# Patient Record
Sex: FEMALE | Race: WHITE | Employment: FULL TIME | URBAN - METROPOLITAN AREA
[De-identification: names, ages, dates, MRNs, and addresses within clinical notes are randomized per-mention and may not be internally consistent; named-entity substitution may affect disease eponyms.]

---

## 2019-08-29 ENCOUNTER — HOSPITAL ENCOUNTER (OUTPATIENT)
Dept: LAB | Age: 49
Discharge: HOME | End: 2019-08-29
Payer: COMMERCIAL

## 2019-08-29 PROCEDURE — 88175 CYTOPATH C/V AUTO FLUID REDO: CPT

## 2019-08-29 PROCEDURE — 87624 HPV HI-RISK TYP POOLED RSLT: CPT

## 2020-12-25 ENCOUNTER — APPOINTMENT (OUTPATIENT)
Dept: GENERAL RADIOLOGY | Age: 50
End: 2020-12-25
Attending: PHYSICIAN ASSISTANT
Payer: COMMERCIAL

## 2020-12-25 ENCOUNTER — HOSPITAL ENCOUNTER (EMERGENCY)
Age: 50
Discharge: HOME | End: 2020-12-25
Attending: EMERGENCY MEDICINE | Admitting: EMERGENCY MEDICINE
Payer: COMMERCIAL

## 2020-12-25 VITALS
SYSTOLIC BLOOD PRESSURE: 147 MMHG | OXYGEN SATURATION: 98 % | RESPIRATION RATE: 18 BRPM | DIASTOLIC BLOOD PRESSURE: 98 MMHG | HEART RATE: 103 BPM | WEIGHT: 190 LBS | TEMPERATURE: 98 F
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
## 2020-12-25 PROCEDURE — 75810000293 HC SIMP/SUPERF WND  RPR

## 2020-12-25 PROCEDURE — 90471 IMMUNIZATION ADMIN: CPT

## 2020-12-25 PROCEDURE — 90686 IIV4 VACC NO PRSV 0.5 ML IM: CPT | Performed by: PHYSICIAN ASSISTANT

## 2020-12-25 PROCEDURE — 90472 IMMUNIZATION ADMIN EACH ADD: CPT

## 2020-12-25 PROCEDURE — 74011250637 HC RX REV CODE- 250/637: Performed by: PHYSICIAN ASSISTANT

## 2020-12-25 PROCEDURE — 73120 X-RAY EXAM OF HAND: CPT

## 2020-12-25 PROCEDURE — 90715 TDAP VACCINE 7 YRS/> IM: CPT | Performed by: PHYSICIAN ASSISTANT

## 2020-12-25 PROCEDURE — 74011250636 HC RX REV CODE- 250/636: Performed by: PHYSICIAN ASSISTANT

## 2020-12-25 PROCEDURE — 99283 EMERGENCY DEPT VISIT LOW MDM: CPT

## 2020-12-25 RX ORDER — AMOXICILLIN AND CLAVULANATE POTASSIUM 875; 125 MG/1; MG/1
1 TABLET, FILM COATED ORAL 2 TIMES DAILY
Qty: 14 TAB | Refills: 0 | Status: SHIPPED | OUTPATIENT
Start: 2020-12-25 | End: 2021-01-01

## 2020-12-25 RX ORDER — HYDROCODONE BITARTRATE AND ACETAMINOPHEN 5; 325 MG/1; MG/1
1 TABLET ORAL
Qty: 10 TAB | Refills: 0 | Status: SHIPPED | OUTPATIENT
Start: 2020-12-25 | End: 2020-12-28

## 2020-12-25 RX ORDER — LANOLIN ALCOHOL/MO/W.PET/CERES
1000 CREAM (GRAM) TOPICAL DAILY
COMMUNITY

## 2020-12-25 RX ORDER — AMOXICILLIN AND CLAVULANATE POTASSIUM 875; 125 MG/1; MG/1
1 TABLET, FILM COATED ORAL
Status: COMPLETED | OUTPATIENT
Start: 2020-12-25 | End: 2020-12-25

## 2020-12-25 RX ORDER — HYDROCODONE BITARTRATE AND ACETAMINOPHEN 5; 325 MG/1; MG/1
1 TABLET ORAL
Status: COMPLETED | OUTPATIENT
Start: 2020-12-25 | End: 2020-12-25

## 2020-12-25 RX ADMIN — TETANUS TOXOID, REDUCED DIPHTHERIA TOXOID AND ACELLULAR PERTUSSIS VACCINE, ADSORBED 0.5 ML: 5; 2.5; 8; 8; 2.5 SUSPENSION INTRAMUSCULAR at 17:52

## 2020-12-25 RX ADMIN — AMOXICILLIN AND CLAVULANATE POTASSIUM 1 TABLET: 875; 125 TABLET, FILM COATED ORAL at 19:38

## 2020-12-25 RX ADMIN — HYDROCODONE BITARTRATE AND ACETAMINOPHEN 1 TABLET: 5; 325 TABLET ORAL at 17:51

## 2020-12-25 RX ADMIN — INFLUENZA VIRUS VACCINE 0.5 ML: 15; 15; 15; 15 SUSPENSION INTRAMUSCULAR at 19:38

## 2020-12-25 NOTE — ED TRIAGE NOTES
Alert female arrives to ED after dog bite pta, reports it was her dog and dogs shots utd. Pt with wounds to rt hand. Bleeding controlled with dressing. Pt asking for tetanus update. Pain 9/10 throbbing.

## 2020-12-26 NOTE — ED PROVIDER NOTES
EMERGENCY DEPARTMENT HISTORY AND PHYSICAL EXAM    Date: 12/25/2020  Patient Name: José Luis Flores    History of Presenting Illness     Chief Complaint   Patient presents with    Dog Bite         History Provided By:     Chief Complaint:  Duration:   Timing:    Location:   Quality:   Severity:   Modifying Factors:   Associated Symptoms: none       Additional History (Context): José Luis Flores is a 52 y.o. female with a history of       PCP: Salvatore Garcia MD    Current Outpatient Medications   Medication Sig Dispense Refill    cyanocobalamin 1,000 mcg tablet Take 1,000 mcg by mouth daily.  HYDROcodone-acetaminophen (Norco) 5-325 mg per tablet Take 1 Tab by mouth every six (6) hours as needed for Pain for up to 3 days. Max Daily Amount: 4 Tabs. 10 Tab 0    amoxicillin-clavulanate (Augmentin) 875-125 mg per tablet Take 1 Tab by mouth two (2) times a day for 7 days. 14 Tab 0       Past History     Past Medical History:  History reviewed. No pertinent past medical history. Past Surgical History:  History reviewed. No pertinent surgical history. Family History:  History reviewed. No pertinent family history. Social History:  Social History     Tobacco Use    Smoking status: Not on file   Substance Use Topics    Alcohol use: Not on file    Drug use: Not on file       Allergies:  No Known Allergies      Review of Systems   Review of Systems   Constitutional: Negative for chills and fever. HENT: Negative for congestion, rhinorrhea and sore throat. Respiratory: Negative for cough and shortness of breath. Cardiovascular: Negative for chest pain. Gastrointestinal: Negative for abdominal pain, blood in stool, constipation, diarrhea, nausea and vomiting. Genitourinary: Negative for dysuria, frequency and hematuria. Musculoskeletal: Negative for back pain and myalgias. Skin: Positive for wound. Negative for rash. Neurological: Negative for dizziness and headaches.    All other systems reviewed and are negative. All Other Systems Negative  Physical Exam     Vitals:    12/25/20 1708   BP: (!) 147/98   Pulse: (!) 103   Resp: 18   Temp: 98 °F (36.7 °C)   SpO2: 98%   Weight: 86.2 kg (190 lb)     Physical Exam  Vitals signs and nursing note reviewed. Constitutional:       General: She is not in acute distress. Appearance: She is well-developed. She is not diaphoretic. HENT:      Head: Normocephalic and atraumatic. Eyes:      Conjunctiva/sclera: Conjunctivae normal.   Neck:      Musculoskeletal: Normal range of motion and neck supple. Cardiovascular:      Rate and Rhythm: Normal rate and regular rhythm. Heart sounds: Normal heart sounds. Pulmonary:      Effort: Pulmonary effort is normal. No respiratory distress. Breath sounds: Normal breath sounds. Chest:      Chest wall: No tenderness. Abdominal:      General: Bowel sounds are normal. There is no distension. Palpations: Abdomen is soft. Tenderness: There is no abdominal tenderness. There is no guarding or rebound. Musculoskeletal:         General: No deformity. Skin:     General: Skin is warm and dry. Findings: Laceration and wound present. Comments: Multiple small puncture wounds noted to the right pickett surface    1 - 1.5 cm laceration noted to the right palm     1- 3 cm laceration noted to the right pinky     1- L shaped laceration noted to the dorsal aspect of the right hand approx 7 cm    Neurological:      Mental Status: She is alert and oriented to person, place, and time. Deep Tendon Reflexes: Reflexes are normal and symmetric. Diagnostic Study Results     Labs -   No results found for this or any previous visit (from the past 12 hour(s)). Radiologic Studies -   XR HAND RT AP/LAT    (Results Pending)     CT Results  (Last 48 hours)    None        CXR Results  (Last 48 hours)    None            Medical Decision Making   I am the first provider for this patient.     I reviewed the vital signs, available nursing notes, past medical history, past surgical history, family history and social history. Vital Signs-Reviewed the patient's vital signs. Records Reviewed: Nursing Notes and Old Medical Records     Procedures: None   Wound Repair    Date/Time: 12/25/2020 7:37 PM  Performed by: PAPreparation: skin prepped with Betadine and sterile field established  Location details: right hand  Wound length:2.6 - 7.5 cm  Anesthesia: local infiltration    Anesthesia:  Local Anesthetic: lidocaine 1% without epinephrine  Foreign bodies: no foreign bodies  Irrigation solution: saline  Debridement: minimal  Skin closure: 5-0 nylon and 4-0 nylon  Number of sutures: 13  Technique: simple  Laceration repair approximation: Close and loose   Dressing: gauze roll and non-adhesive packing strip  Patient tolerance: patient tolerated the procedure well with no immediate complications  My total time at bedside, performing this procedure was 61+ minutes. Comments: 1 - 1.5 cm laceration noted to the right palm - 2 sutures placed     1- 3 cm laceration noted to the right pinky - 5 sutures placed     1- L shaped laceration noted to the dorsal aspect of the right hand approx 7 cm - 6 sutures         Provider Notes (Medical Decision Making):     Differential: fracture, dislocation, abrasion, sprain, contusion, laceration, retained foreign body, puncture wound      Plan: Will order xrays, update tetanus, give flu shot, pain medicine and will clean and repair wounds. Dr. Sylvester Grace has seen and evaluated the patient as well.    7:39 PM  Wounds were repaired without any difficulties. Have discussed proper at home wound care with patient. Have advised patient to return in 2 days for wound check. Have sent prescription for Augmentin and Norco.    We will also give hand surgery follow-up. MED RECONCILIATION:  No current facility-administered medications for this encounter.       Current Outpatient Medications   Medication Sig    cyanocobalamin 1,000 mcg tablet Take 1,000 mcg by mouth daily.  HYDROcodone-acetaminophen (Norco) 5-325 mg per tablet Take 1 Tab by mouth every six (6) hours as needed for Pain for up to 3 days. Max Daily Amount: 4 Tabs.  amoxicillin-clavulanate (Augmentin) 875-125 mg per tablet Take 1 Tab by mouth two (2) times a day for 7 days. Disposition:  Home     DISCHARGE NOTE:   Pt has been reexamined. Patient has no new complaints, changes, or physical findings. Care plan outlined and precautions discussed. Results of workup were reviewed with the patient. All medications were reviewed with the patient. All of pt's questions and concerns were addressed. Patient was instructed and agrees to follow up with PCP as well as to return to the ED upon further deterioration. Patient is ready to go home. Follow-up Information     Follow up With Specialties Details Why [address] [facility] EMERGENCY DEPT Emergency Medicine In 2 days For wound re-check [facility]  Michael Schmitz  [phone]    Kristal Cardoza DO Hand Surgery Schedule an appointment as soon as possible for a visit   [address] [address]  [phone]            Current Discharge Medication List      START taking these medications    Details   HYDROcodone-acetaminophen (Norco) 5-325 mg per tablet Take 1 Tab by mouth every six (6) hours as needed for Pain for up to 3 days. Max Daily Amount: 4 Tabs. Qty: 10 Tab, Refills: 0    Associated Diagnoses: Dog bite, initial encounter      amoxicillin-clavulanate (Augmentin) 875-125 mg per tablet Take 1 Tab by mouth two (2) times a day for 7 days. Qty: 14 Tab, Refills: 0                 Diagnosis     Clinical Impression:   1. Dog bite, initial encounter          \"Please note that this dictation was completed with [employer], the [employer] voice recognition software.  Quite often unanticipated grammatical, syntax, homophones, and other interpretive errors are inadvertently transcribed by the computer software. Please disregard these errors. Please excuse any errors that have escaped final proofreading. \"

## 2020-12-26 NOTE — ED NOTES
Report from Shreya Valencia, [facility]. Provider remains at bedside performing lac repair.

## 2020-12-27 ENCOUNTER — HOSPITAL ENCOUNTER (EMERGENCY)
Age: 50
Discharge: HOME | End: 2020-12-27
Attending: EMERGENCY MEDICINE
Payer: COMMERCIAL

## 2020-12-27 VITALS
DIASTOLIC BLOOD PRESSURE: 64 MMHG | HEART RATE: 100 BPM | TEMPERATURE: 97.6 F | SYSTOLIC BLOOD PRESSURE: 126 MMHG | RESPIRATION RATE: 18 BRPM | OXYGEN SATURATION: 100 %
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
## 2020-12-27 DIAGNOSIS — Z51.89 VISIT FOR WOUND CHECK: Primary | ICD-10-CM

## 2020-12-27 PROCEDURE — 99282 EMERGENCY DEPT VISIT SF MDM: CPT

## 2020-12-27 NOTE — ED TRIAGE NOTES
Bit by her personal dog right hand  Seen celeste tapia received 13 stiches  Put on abx is taking abx twice a day  Denies fever  States redness and swelling to arm  Pt states she was told on her dc papers to come back today for a wound recheck  Pt states she has not removed the original dressing has not been removed

## 2020-12-27 NOTE — ED NOTES
I have reviewed discharge instructions with the patient. The patient verbalized understanding.      Alert oriented INAD ambulatory to lobby

## 2020-12-27 NOTE — ED PROVIDER NOTES
EMERGENCY DEPARTMENT HISTORY AND PHYSICAL EXAM    Date: 12/27/2020  Patient Name: Bang Brandon    History of Presenting Illness     Chief Complaint   Patient presents with    Wound Check         History Provided By: Patient    Chief Complaint: Wound check  Duration: 2 days ago  Timing: N/A  Location: Right hand  Quality: Aching   Severity: Moderate  Modifying Factors: None  Associated Symptoms: none       Additional History (Context): Bang Brandon is a 52 y.o. female with a history of dog bite to the right hand who presents today for wound check. Patient reports she has not taken her bandage off since leaving the emergency department because she was \"scared\". Has been taking her antibiotics as prescribed, has had 4 doses at this point. Has been taking pain medication with relief. Denies any fevers or chills. Denies any decreased sensation in her hand or pain with finger movements. PCP: Noemy Snell MD    Current Outpatient Medications   Medication Sig Dispense Refill    cyanocobalamin 1,000 mcg tablet Take 1,000 mcg by mouth daily.  HYDROcodone-acetaminophen (Norco) 5-325 mg per tablet Take 1 Tab by mouth every six (6) hours as needed for Pain for up to 3 days. Max Daily Amount: 4 Tabs. 10 Tab 0    amoxicillin-clavulanate (Augmentin) 875-125 mg per tablet Take 1 Tab by mouth two (2) times a day for 7 days. 14 Tab 0       Past History     Past Medical History:  History reviewed. No pertinent past medical history. Past Surgical History:  History reviewed. No pertinent surgical history. Family History:  History reviewed. No pertinent family history. Social History:  Social History     Tobacco Use    Smoking status: Not on file   Substance Use Topics    Alcohol use: Not on file    Drug use: Not on file       Allergies:  No Known Allergies      Review of Systems   Review of Systems   Constitutional: Negative for chills and fever.    HENT: Negative for congestion, rhinorrhea and sore throat. Respiratory: Negative for cough and shortness of breath. Cardiovascular: Negative for chest pain. Gastrointestinal: Negative for abdominal pain, blood in stool, constipation, diarrhea, nausea and vomiting. Genitourinary: Negative for dysuria, frequency and hematuria. Musculoskeletal: Negative for back pain and myalgias. Skin: Positive for color change and wound. Negative for rash. Neurological: Negative for dizziness and headaches. All other systems reviewed and are negative. All Other Systems Negative  Physical Exam     Vitals:    12/27/20 1103   BP: (!) 145/85   Pulse: 100   Resp: 18   Temp: 97.6 °F (36.4 °C)   SpO2: 100%     Physical Exam  Vitals signs and nursing note reviewed. Constitutional:       General: She is not in acute distress. Appearance: She is well-developed. She is not diaphoretic. HENT:      Head: Normocephalic and atraumatic. Eyes:      Conjunctiva/sclera: Conjunctivae normal.   Neck:      Musculoskeletal: Normal range of motion and neck supple. Cardiovascular:      Rate and Rhythm: Normal rate and regular rhythm. Heart sounds: Normal heart sounds. Pulmonary:      Effort: Pulmonary effort is normal. No respiratory distress. Breath sounds: Normal breath sounds. Chest:      Chest wall: No tenderness. Abdominal:      General: Bowel sounds are normal. There is no distension. Palpations: Abdomen is soft. Tenderness: There is no abdominal tenderness. There is no guarding or rebound. Musculoskeletal:         General: No deformity. Skin:     General: Skin is warm and dry. Findings: Erythema, laceration and wound present. Comments: Multiple lacerations with sutures in place noted to the right hand, multiple puncture wounds noted, mild overlying erythema and very mild warmth noted. No drainage. Strong radial pulse, full range of motion of all digits without pain. Cap refill less than 2 seconds.    Neurological:      Mental Status: She is alert and oriented to person, place, and time. Deep Tendon Reflexes: Reflexes are normal and symmetric. Diagnostic Study Results     Labs -   No results found for this or any previous visit (from the past 12 hour(s)). Radiologic Studies -   No orders to display     CT Results  (Last 48 hours)    None        CXR Results  (Last 48 hours)    None            Medical Decision Making   I am the first provider for this patient. I reviewed the vital signs, available nursing notes, past medical history, past surgical history, family history and social history. Vital Signs-Reviewed the patient's vital signs. Records Reviewed: Nursing Notes and Old Medical Records     Procedures: None   Procedures    Provider Notes (Medical Decision Making):     Differential: Wound check, suture removal, cellulitis, abscess, tenosynovitis        Plan: No clinical signs of tenosynovitis at this time. Have discussed case with Dr. Fidencio Benitez who agrees with the following plan. Have advised patient to continue Augmentin at home and stressed the importance of compliance. Have advised patient to return in 2 days for wound check. Have dressed wound and nonstick bulky dressing and have discussed the importance of at home wound care. Have stressed the importance of changing dressings. Patient agrees. Have advised patient to return as needed should symptoms worsen before her next wound check. Patient reports she will call hand surgery on Monday. MED RECONCILIATION:  No current facility-administered medications for this encounter. Current Outpatient Medications   Medication Sig    cyanocobalamin 1,000 mcg tablet Take 1,000 mcg by mouth daily.  HYDROcodone-acetaminophen (Norco) 5-325 mg per tablet Take 1 Tab by mouth every six (6) hours as needed for Pain for up to 3 days. Max Daily Amount: 4 Tabs.     amoxicillin-clavulanate (Augmentin) 875-125 mg per tablet Take 1 Tab by mouth two (2) times a day for 7 days. Disposition:  Home     DISCHARGE NOTE:   Pt has been reexamined. Patient has no new complaints, changes, or physical findings. Care plan outlined and precautions discussed. Results of workup were reviewed with the patient. All medications were reviewed with the patient. All of pt's questions and concerns were addressed. Patient was instructed and agrees to follow up with hand surgery as well as to return to the ED upon further deterioration. Patient is ready to go home. Follow-up Information     Follow up With Specialties Details Why [address] [facility] EMERGENCY DEPT Emergency Medicine In 2 days For wound re-check [address]  [phone]          Current Discharge Medication List              Diagnosis     Clinical Impression:   1. Visit for wound check    2. Dog bite, subsequent encounter          \"Please note that this dictation was completed with [employer], the computer voice recognition software. Quite often unanticipated grammatical, syntax, homophones, and other interpretive errors are inadvertently transcribed by the computer software. Please disregard these errors. Please excuse any errors that have escaped final proofreading. \"

## 2020-12-27 NOTE — ED NOTES
telfa pad placed over suture sites right hand  Secured with kerlix and kerlix secured with band aids  Pt aware of wound care for at home  Pt to come back for wound recheck in two days per PA and pt is aware

## 2020-12-27 NOTE — ED NOTES
Pt right hand soaked in sterile saline and betadine solution   Hand to soak for 30 minutes per verbal order chapito whitney

## 2020-12-29 ENCOUNTER — HOSPITAL ENCOUNTER (EMERGENCY)
Age: 50
Discharge: HOME | End: 2020-12-29
Attending: EMERGENCY MEDICINE
Payer: COMMERCIAL

## 2020-12-29 VITALS
TEMPERATURE: 98.6 F | OXYGEN SATURATION: 100 % | RESPIRATION RATE: 18 BRPM | DIASTOLIC BLOOD PRESSURE: 74 MMHG | SYSTOLIC BLOOD PRESSURE: 120 MMHG | HEART RATE: 75 BPM
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
## 2020-12-29 PROCEDURE — 74011000250 HC RX REV CODE- 250: Performed by: EMERGENCY MEDICINE

## 2020-12-29 PROCEDURE — 99282 EMERGENCY DEPT VISIT SF MDM: CPT

## 2020-12-29 RX ORDER — BACITRACIN ZINC 500 [USP'U]/G
1 CREAM TOPICAL
Status: COMPLETED | OUTPATIENT
Start: 2020-12-29 | End: 2020-12-29

## 2020-12-29 RX ADMIN — Medication 1 PACKET: at 08:02

## 2020-12-29 NOTE — ED PROVIDER NOTES
[address]  EMERGENCY DEPARTMENT HISTORY AND PHYSICAL EXAM       Date: 12/29/2020   Patient Name: Bang Brandon   YOB: 1970  Medical Record Number: [id number]    HISTORY OF PRESENTING ILLNESS:     Bang Brandon is a 52 y.o. female presenting with the noted PMH to the ED c/o wound recheck. Patient states she was asked to come back here in 2 days for wound recheck. She states 4 days ago she was seen here after her dog bit her. To her right hand. Both over the dorsum and volar aspect. As well as her pinky. She states she is called her primary doctor to try to get a referral to a hand specialist.  They are currently working on that. She states she does have some numbness to the tip of her pinky area. She states she can feel it but just feels a little bit numb to her. Otherwise she states her arm and hand are doing better. She states she feels much better since when it first happened. Tetanus is up-to-date. Rest of complete systems reviewed and negative    Primary Care Provider: Noemy Snell MD   Specialist:    Past Medical History:   History reviewed. No pertinent past medical history. Past Surgical History:   History reviewed. No pertinent surgical history. Social History:   Social History     Tobacco Use    Smoking status: Not on file   Substance Use Topics    Alcohol use: Not on file    Drug use: Not on file        Allergies:   No Known Allergies     REVIEW OF SYSTEMS:  Review of Systems      PHYSICAL EXAM:  Vitals:    12/29/20 0735   BP: 120/74   Pulse: 75   Resp: 18   Temp: 98.6 °F (37 °C)   SpO2: 100%       Physical Exam  Vital signs reviewed. Alert oriented x 3 in NAD. HEENT: normocephalic atraumatic. Eyes are PERRLA EOMI. Conjunctiva normal.    External ears and nose normal.    Neck: normal external exam. No midline neck or back TTP. Lungs are clear to ascultation bilaterally.   normal effort  Heart is regular rate and rhythm with no murmurs. Abdomen soft and nontender. No rebound rigidity or guarding. Extremities: Moves all 4 extremities and no distress. Full range of motion. 2+ pulses and BCR in all 4 extremities. Flexion extension intact. Neuro: Normal gait. 5 out of 5 strength in all 4 extremities. No facial droop. Skin examination: intact. no rashes. No petechia or purpura. Wounds of her right hand both dorsal and volar aspects and right pinky volar aspect. Sutures in place. Healing well. No redness swelling or discharge. Medications   bacitracin zinc 500 unit/gram packet 1 Packet (has no administration in time range)       RESULTS:    Labs -   Labs Reviewed - No data to display    Radiologic Studies -  No results found. MEDICAL DECISION MAKING    At this point discussed with patient about wound care instructions. Patient states understanding and agrees with plan. Patient to follow-up for suture removal.  Precautions explained. Wound healing well. No signs of cellulitis. No evidence of fracture or dislocation. No evidence of compartment syndrome. No evidence of septic joint. Patient has no new complaints, changes, or physical findings. Results were reviewed with the patient. Pt's questions and concerns were addressed. Care plan was outlined, including follow-up with PCP/specialist and return precautions were discussed. Patient is felt to be stable for discharge at this time. Diagnosis   Clinical Impression:   1. Dog bite, subsequent encounter    Wound recheck of right hand and right pinky. Follow-up Information     Follow up With Specialties Details Why Contact Info    Terry Yeung MD Internal Medicine Go in 2 days If symptoms worsen, As needed [address] [facility]  [phone]            Current Discharge Medication List          discharged in stable and improved condition.     This chart was completed using Dragon, a dictation transcription service. Errors may have resulted from using this device.

## 2022-09-13 ENCOUNTER — HOSPITAL ENCOUNTER (OUTPATIENT)
Dept: LAB | Age: 52
Discharge: HOME | End: 2022-09-13
Payer: COMMERCIAL

## 2022-09-13 PROCEDURE — 87624 HPV HI-RISK TYP POOLED RSLT: CPT

## 2022-09-13 PROCEDURE — 88175 CYTOPATH C/V AUTO FLUID REDO: CPT
